# Patient Record
Sex: MALE | Race: WHITE | URBAN - METROPOLITAN AREA
[De-identification: names, ages, dates, MRNs, and addresses within clinical notes are randomized per-mention and may not be internally consistent; named-entity substitution may affect disease eponyms.]

---

## 2020-10-13 ENCOUNTER — WEB ENCOUNTER (OUTPATIENT)
Dept: URBAN - METROPOLITAN AREA CLINIC 13 | Facility: CLINIC | Age: 50
End: 2020-10-13

## 2021-02-17 ENCOUNTER — OFFICE VISIT (OUTPATIENT)
Dept: URBAN - METROPOLITAN AREA SURGERY CENTER 31 | Facility: SURGERY CENTER | Age: 51
End: 2021-02-17
Payer: COMMERCIAL

## 2021-02-17 ENCOUNTER — OFFICE VISIT (OUTPATIENT)
Dept: URBAN - METROPOLITAN AREA MEDICAL CENTER 25 | Facility: MEDICAL CENTER | Age: 51
End: 2021-02-17

## 2021-02-17 ENCOUNTER — CLAIMS CREATED FROM THE CLAIM WINDOW (OUTPATIENT)
Dept: URBAN - METROPOLITAN AREA CLINIC 4 | Facility: CLINIC | Age: 51
End: 2021-02-17
Payer: COMMERCIAL

## 2021-02-17 DIAGNOSIS — C20 CANCER OF RECTUM: ICD-10-CM

## 2021-02-17 DIAGNOSIS — Z12.11 COLON CANCER SCREENING: ICD-10-CM

## 2021-02-17 DIAGNOSIS — C18.9 MALIGNANT NEOPLASM OF COLON, UNSPECIFIED: ICD-10-CM

## 2021-02-17 PROCEDURE — G8907 PT DOC NO EVENTS ON DISCHARG: HCPCS | Performed by: INTERNAL MEDICINE

## 2021-02-17 PROCEDURE — 88342 IMHCHEM/IMCYTCHM 1ST ANTB: CPT | Performed by: PATHOLOGY

## 2021-02-17 PROCEDURE — 88341 IMHCHEM/IMCYTCHM EA ADD ANTB: CPT | Performed by: PATHOLOGY

## 2021-02-17 PROCEDURE — 45380 COLONOSCOPY AND BIOPSY: CPT | Performed by: INTERNAL MEDICINE

## 2025-03-19 ENCOUNTER — DASHBOARD ENCOUNTERS (OUTPATIENT)
Age: 55
End: 2025-03-19

## 2025-03-19 ENCOUNTER — OFFICE VISIT (OUTPATIENT)
Dept: URBAN - METROPOLITAN AREA CLINIC 19 | Facility: CLINIC | Age: 55
End: 2025-03-19
Payer: COMMERCIAL

## 2025-03-19 VITALS
DIASTOLIC BLOOD PRESSURE: 71 MMHG | OXYGEN SATURATION: 96 % | TEMPERATURE: 98.3 F | SYSTOLIC BLOOD PRESSURE: 139 MMHG | HEIGHT: 74 IN | WEIGHT: 170.6 LBS | BODY MASS INDEX: 21.89 KG/M2 | HEART RATE: 58 BPM

## 2025-03-19 DIAGNOSIS — R19.8 ALTERNATING CONSTIPATION AND DIARRHEA: ICD-10-CM

## 2025-03-19 DIAGNOSIS — R15.9 FECAL INCONTINENCE: ICD-10-CM

## 2025-03-19 DIAGNOSIS — Z85.048 HISTORY OF RECTAL CANCER: ICD-10-CM

## 2025-03-19 PROCEDURE — 99204 OFFICE O/P NEW MOD 45 MIN: CPT | Performed by: NURSE PRACTITIONER

## 2025-03-19 RX ORDER — HYDROXYZINE HYDROCHLORIDE 25 MG/1
TABLET, FILM COATED ORAL
Qty: 90 TABLET | Status: ACTIVE | COMMUNITY

## 2025-03-19 RX ORDER — PROPRANOLOL HYDROCHLORIDE 10 MG/1
TAKE 1 TO 2 TABLETS BY MOUTH EVERY 8 HOURS AS NEEDED FOR ANXIETY TABLET ORAL
Qty: 90 EACH | Refills: 3 | Status: ACTIVE | COMMUNITY

## 2025-03-19 RX ORDER — CETIRIZINE HYDROCHLORIDE TABLETS 10 MG/1
TAKE 1 TABLET BY MOUTH IN THE MORNING TABLET, FILM COATED ORAL
Qty: 30 EACH | Refills: 0 | Status: ACTIVE | COMMUNITY

## 2025-03-19 RX ORDER — LOSARTAN POTASSIUM 50 MG/1
TABLET, FILM COATED ORAL
Qty: 90 TABLET | Status: ACTIVE | COMMUNITY

## 2025-03-19 RX ORDER — FLUOXETINE 40 MG/1
TAKE 2 CAPSULES BY MOUTH DAILY CAPSULE ORAL
Qty: 180 EACH | Refills: 0 | Status: ACTIVE | COMMUNITY

## 2025-03-19 RX ORDER — COLESTIPOL HYDROCHLORIDE 1 G/1
TAKE 1 TABLET BY MOUTH TWICE DAILY WITH A FULL GLASS OF WATER TABLET, FILM COATED ORAL
Qty: 60 EACH | Refills: 0 | Status: ACTIVE | COMMUNITY

## 2025-03-19 RX ORDER — CLONAZEPAM 0.5 MG/1
TABLET ORAL
Qty: 30 TABLET | Status: ACTIVE | COMMUNITY

## 2025-03-19 RX ORDER — CHOLESTYRAMINE 4 G/9G
1 SCOOP POWDER, FOR SUSPENSION ORAL ONCE A DAY
Qty: 90 | Refills: 3 | OUTPATIENT
Start: 2025-03-19

## 2025-03-19 RX ORDER — GABAPENTIN 100 MG/1
TAKE 1 CAPSULE BY MOUTH TWICE DAILY CAPSULE ORAL
Qty: 60 EACH | Refills: 1 | Status: ACTIVE | COMMUNITY

## 2025-03-19 RX ORDER — HYDROXYZINE HYDROCHLORIDE 25 MG/1
TAKE 1 TO 2 TABLETS BY MOUTH EVERY 6 TO 8 HOURS AS NEEDED FOR ANXIETY TABLET, FILM COATED ORAL
Qty: 90 EACH | Refills: 0 | Status: ACTIVE | COMMUNITY

## 2025-03-19 NOTE — HPI-TODAY'S VISIT:
54-year-old male with PMH of Stage 3 rectal cancer presents for complaints of bowel habit changes.  He originally presented with symptoms of rectal bleeding, change in bowel habits, decrease stool caliber and diarrhea in 2021. He had a colonoscopy with Dr. VINES on 2/17/2021 which found a rectal mass with path consistent with invasive adenocarcinoma, moderately differentiated (G2) He was referred to Dr. Deluna with colorectal surgery and Dr. Mcgee with Oncology  Neoadjuvant therapy and XRT followed by resection Underwent robotic low anterior resection end ileostomy 11/2021 followed by open ileostomy closure 2/2022   He presents with his wife. She reports his bowel habits never returned back to normal since he was diagnosed. Now has better control of his bowels but has constant urges. Some days in the bathroom for many hours. Unable to hold a job because of being unable to come out the bathroom. Did see PT for pelvic floor PT, the exercises seemed to tighten so much that he became unable to expel his waste, found an excessive amount of scar tissue, was able to help loosen it some but things worse now. Has fecal incontinence all day, unable to control urges.  Last colonoscopy was around 10/2022 with Dr. Deluna (don't have report of this) can not recall but was either 2-3 yrs to f/u. No blood in stool  Does have lower abdominal pain and rectal pain occasionally.  Which induces him a migraine so he starts to vomit. Does admit to high anxiety issues.  Last saw Dr. Mcgee in 2023 who wanted him to get MRI but he was unable to afford it so never followed up.  Last saw his PCP July last year and states labs were good.  Stools vary between watery - constipated with hard balls of stools. Has a BM about 5-6 times/day. If he takes Imodium, then can go a week without a BM.  Tried fiber, no relief. Kaopectate, Pepto no relief. He is taking Colestipol BID

## 2025-03-20 LAB
A/G RATIO: 1.6
ALBUMIN: 4.4
ALKALINE PHOSPHATASE: 84
ALT (SGPT): 39
AST (SGOT): 25
BILIRUBIN, TOTAL: 0.4
BUN/CREATININE RATIO: (no result)
BUN: 14
CALCIUM: 9.7
CARBON DIOXIDE, TOTAL: 26
CEA: <2
CHLORIDE: 104
CREATININE: 0.94
EGFR: 96
GLOBULIN, TOTAL: 2.7
GLUCOSE: 113
HEMATOCRIT: 43.9
HEMOGLOBIN: 14.4
MCH: 32.7
MCHC: 32.8
MCV: 99.5
MPV: 10.8
PLATELET COUNT: 238
POTASSIUM: 4.8
PROTEIN, TOTAL: 7.1
RDW: 13.4
RED BLOOD CELL COUNT: 4.41
SODIUM: 139
WHITE BLOOD CELL COUNT: 11.2

## 2025-04-04 ENCOUNTER — OFFICE VISIT (OUTPATIENT)
Dept: URBAN - METROPOLITAN AREA CLINIC 128 | Facility: CLINIC | Age: 55
End: 2025-04-04

## 2025-04-07 ENCOUNTER — TELEPHONE ENCOUNTER (OUTPATIENT)
Dept: URBAN - METROPOLITAN AREA CLINIC 128 | Facility: CLINIC | Age: 55
End: 2025-04-07

## 2025-04-16 ENCOUNTER — OFFICE VISIT (OUTPATIENT)
Dept: URBAN - METROPOLITAN AREA SURGERY CENTER 31 | Facility: SURGERY CENTER | Age: 55
End: 2025-04-16

## 2025-05-20 ENCOUNTER — CLAIMS CREATED FROM THE CLAIM WINDOW (OUTPATIENT)
Dept: URBAN - METROPOLITAN AREA SURGERY CENTER 31 | Facility: SURGERY CENTER | Age: 55
End: 2025-05-20
Payer: COMMERCIAL

## 2025-05-20 ENCOUNTER — CLAIMS CREATED FROM THE CLAIM WINDOW (OUTPATIENT)
Dept: URBAN - METROPOLITAN AREA CLINIC 4 | Facility: CLINIC | Age: 55
End: 2025-05-20
Payer: COMMERCIAL

## 2025-05-20 DIAGNOSIS — Z85.048 HISTORY OF RECTAL CANCER: ICD-10-CM

## 2025-05-20 DIAGNOSIS — K63.89 OTHER SPECIFIED DISEASES OF INTESTINE: ICD-10-CM

## 2025-05-20 DIAGNOSIS — R19.4 ALTERATION IN BOWEL ELIMINATION: ICD-10-CM

## 2025-05-20 DIAGNOSIS — Z85.048 H/O MALIGNANT NEOPLASM OF RECTUM: ICD-10-CM

## 2025-05-20 PROCEDURE — 88305 TISSUE EXAM BY PATHOLOGIST: CPT | Performed by: PATHOLOGY

## 2025-05-20 PROCEDURE — 45380 COLONOSCOPY AND BIOPSY: CPT | Performed by: INTERNAL MEDICINE

## 2025-05-20 PROCEDURE — 00811 ANES LWR INTST NDSC NOS: CPT | Performed by: NURSE ANESTHETIST, CERTIFIED REGISTERED

## 2025-05-20 RX ORDER — GABAPENTIN 100 MG/1
TAKE 1 CAPSULE BY MOUTH TWICE DAILY CAPSULE ORAL
Qty: 60 EACH | Refills: 1 | Status: ACTIVE | COMMUNITY

## 2025-05-20 RX ORDER — CHOLESTYRAMINE 4 G/9G
1 SCOOP POWDER, FOR SUSPENSION ORAL ONCE A DAY
Qty: 90 | Refills: 3 | Status: ACTIVE | COMMUNITY
Start: 2025-03-19

## 2025-05-20 RX ORDER — PROPRANOLOL HYDROCHLORIDE 10 MG/1
TAKE 1 TO 2 TABLETS BY MOUTH EVERY 8 HOURS AS NEEDED FOR ANXIETY TABLET ORAL
Qty: 90 EACH | Refills: 3 | Status: ACTIVE | COMMUNITY

## 2025-05-20 RX ORDER — COLESTIPOL HYDROCHLORIDE 1 G/1
TAKE 1 TABLET BY MOUTH TWICE DAILY WITH A FULL GLASS OF WATER TABLET, FILM COATED ORAL
Qty: 60 EACH | Refills: 0 | Status: ACTIVE | COMMUNITY

## 2025-05-20 RX ORDER — HYDROXYZINE HYDROCHLORIDE 25 MG/1
TABLET, FILM COATED ORAL
Qty: 90 TABLET | Status: ACTIVE | COMMUNITY

## 2025-05-20 RX ORDER — HYDROXYZINE HYDROCHLORIDE 25 MG/1
TAKE 1 TO 2 TABLETS BY MOUTH EVERY 6 TO 8 HOURS AS NEEDED FOR ANXIETY TABLET, FILM COATED ORAL
Qty: 90 EACH | Refills: 0 | Status: ACTIVE | COMMUNITY

## 2025-05-20 RX ORDER — CETIRIZINE HYDROCHLORIDE TABLETS 10 MG/1
TAKE 1 TABLET BY MOUTH IN THE MORNING TABLET, FILM COATED ORAL
Qty: 30 EACH | Refills: 0 | Status: ACTIVE | COMMUNITY

## 2025-05-20 RX ORDER — LOSARTAN POTASSIUM 50 MG/1
TABLET, FILM COATED ORAL
Qty: 90 TABLET | Status: ACTIVE | COMMUNITY

## 2025-05-20 RX ORDER — CLONAZEPAM 0.5 MG/1
TABLET ORAL
Qty: 30 TABLET | Status: ACTIVE | COMMUNITY

## 2025-05-20 RX ORDER — FLUOXETINE 40 MG/1
TAKE 2 CAPSULES BY MOUTH DAILY CAPSULE ORAL
Qty: 180 EACH | Refills: 0 | Status: ACTIVE | COMMUNITY

## 2025-05-30 ENCOUNTER — OFFICE VISIT (OUTPATIENT)
Dept: URBAN - METROPOLITAN AREA CLINIC 19 | Facility: CLINIC | Age: 55
End: 2025-05-30
Payer: COMMERCIAL

## 2025-05-30 DIAGNOSIS — Z85.048 HISTORY OF RECTAL CANCER: ICD-10-CM

## 2025-05-30 DIAGNOSIS — K58.0 IRRITABLE BOWEL SYNDROME WITH DIARRHEA: ICD-10-CM

## 2025-05-30 DIAGNOSIS — R15.9 FECAL INCONTINENCE: ICD-10-CM

## 2025-05-30 PROCEDURE — 99214 OFFICE O/P EST MOD 30 MIN: CPT | Performed by: NURSE PRACTITIONER

## 2025-05-30 RX ORDER — PROPRANOLOL HYDROCHLORIDE 10 MG/1
TAKE 1 TO 2 TABLETS BY MOUTH EVERY 8 HOURS AS NEEDED FOR ANXIETY TABLET ORAL
Qty: 90 EACH | Refills: 3 | Status: ACTIVE | COMMUNITY

## 2025-05-30 RX ORDER — GABAPENTIN 100 MG/1
TAKE 1 CAPSULE BY MOUTH TWICE DAILY CAPSULE ORAL
Qty: 60 EACH | Refills: 1 | Status: ACTIVE | COMMUNITY

## 2025-05-30 RX ORDER — CETIRIZINE HYDROCHLORIDE TABLETS 10 MG/1
TAKE 1 TABLET BY MOUTH IN THE MORNING TABLET, FILM COATED ORAL
Qty: 30 EACH | Refills: 0 | Status: ACTIVE | COMMUNITY

## 2025-05-30 RX ORDER — HYDROXYZINE HYDROCHLORIDE 25 MG/1
TABLET, FILM COATED ORAL
Qty: 90 TABLET | Status: ACTIVE | COMMUNITY

## 2025-05-30 RX ORDER — CHOLESTYRAMINE 4 G/9G
1 SCOOP POWDER, FOR SUSPENSION ORAL ONCE A DAY
Qty: 90 | Refills: 3 | Status: ON HOLD | COMMUNITY
Start: 2025-03-19

## 2025-05-30 RX ORDER — HYDROXYZINE HYDROCHLORIDE 25 MG/1
TAKE 1 TO 2 TABLETS BY MOUTH EVERY 6 TO 8 HOURS AS NEEDED FOR ANXIETY TABLET, FILM COATED ORAL
Qty: 90 EACH | Refills: 0 | Status: ACTIVE | COMMUNITY

## 2025-05-30 RX ORDER — CLONAZEPAM 0.5 MG/1
TABLET ORAL
Qty: 30 TABLET | Status: ACTIVE | COMMUNITY

## 2025-05-30 RX ORDER — LOSARTAN POTASSIUM 50 MG/1
TABLET, FILM COATED ORAL
Qty: 90 TABLET | Status: ACTIVE | COMMUNITY

## 2025-05-30 RX ORDER — COLESTIPOL HYDROCHLORIDE 1 G/1
TAKE 1 TABLET BY MOUTH TWICE DAILY WITH A FULL GLASS OF WATER TABLET, FILM COATED ORAL
Qty: 60 EACH | Refills: 0 | Status: ACTIVE | COMMUNITY

## 2025-05-30 RX ORDER — FLUOXETINE 40 MG/1
TAKE 2 CAPSULES BY MOUTH DAILY CAPSULE ORAL
Qty: 180 EACH | Refills: 0 | Status: ACTIVE | COMMUNITY

## 2025-05-30 NOTE — HPI-TODAY'S VISIT:
54-year-old male with PMH of Stage 3 rectal cancer presents for follow-up.  Last seen here in GI clinic on 3/19/2025 for c/o change in bowel habits. Labs that day: WBC 11.2, Hg 14.4, MCV 99.5,  CMP unremarkable.  CEA negative 5/20/2025 colonoscopy with Dr. Chamberlain patent end to end colorectal anastomosis characterized by healthy-appearing mucosa.  3-year follow-up given Path: Random colon biopsy negative  Still having bowel habit issues (see below) which causes a viscious cycle of anxiety. Wife reports they are both very frustrated with this. Reports his "whole body hurts from chemo." He is on multiple anxiety meds. He states he cant work or drive - and he has difficulty with anyone else driving due to high anxiety so has to close his eyes. He follows with psychiatry and sees them again June 19th Has been having more fecal incontinence at night during sleep.  Cholestyramine made things worse Still on colestipol BID     ----------------------------- Prior history summarized:  He originally presented with symptoms of rectal bleeding, change in bowel habits, decrease stool caliber and diarrhea in 2021. He had a colonoscopy with Dr. Vicente on 2/17/2021 which found a rectal mass with path consistent with invasive adenocarcinoma, moderately differentiated (G2) He was referred to Dr. Deluna with colorectal surgery and Dr. Mcgee with Oncology Neoadjuvant therapy and XRT followed by resection Underwent robotic low anterior resection end ileostomy 11/2021 followed by open ileostomy closure 2/2022   He reported bowel habits never returned back to normal since he was diagnosed. Reported having better control of his bowels but with constant urges. Some days in the bathroom for many hours. Unable to hold a job because of being unable to come out the bathroom. Did see PT for pelvic floor PT, the exercises seemed to tighten so much that he became unable to expel his waste, found an excessive amount of scar tissue, was able to help loosen it some but things became worse. Reported fecal incontinence all day, unable to control urges.  He reported having a colonoscopy around 10/2022 with Dr. Deluna (don't have report of this) can not recall but was either 2-3 yrs to f/u.  Last saw Dr. Mcgee in 2023 who wanted him to get MRI but he was unable to afford it so never followed up.  Last saw his PCP July last year and states labs were good.  Stools vary between watery - constipated with hard balls of stools. Has a BM about 5-6 times/day. If he takes Imodium, then can go a week without a BM.  Tried fiber, no relief. Kaopectate, Pepto no relief. He is taking Colestipol BID.

## 2025-06-04 ENCOUNTER — TELEPHONE ENCOUNTER (OUTPATIENT)
Dept: URBAN - METROPOLITAN AREA CLINIC 19 | Facility: CLINIC | Age: 55
End: 2025-06-04